# Patient Record
Sex: FEMALE | Race: WHITE | NOT HISPANIC OR LATINO | Employment: PART TIME | URBAN - METROPOLITAN AREA
[De-identification: names, ages, dates, MRNs, and addresses within clinical notes are randomized per-mention and may not be internally consistent; named-entity substitution may affect disease eponyms.]

---

## 2019-03-31 ENCOUNTER — APPOINTMENT (OUTPATIENT)
Dept: CT IMAGING | Facility: CLINIC | Age: 59
End: 2019-03-31
Attending: FAMILY MEDICINE
Payer: OTHER MISCELLANEOUS

## 2019-03-31 ENCOUNTER — HOSPITAL ENCOUNTER (EMERGENCY)
Facility: CLINIC | Age: 59
Discharge: HOME | End: 2019-03-31
Attending: FAMILY MEDICINE | Admitting: FAMILY MEDICINE
Payer: OTHER MISCELLANEOUS

## 2019-03-31 VITALS
OXYGEN SATURATION: 99 % | TEMPERATURE: 98.4 F | BODY MASS INDEX: 26.87 KG/M2 | SYSTOLIC BLOOD PRESSURE: 148 MMHG | DIASTOLIC BLOOD PRESSURE: 84 MMHG | WEIGHT: 159 LBS | RESPIRATION RATE: 16 BRPM | HEART RATE: 70 BPM

## 2019-03-31 DIAGNOSIS — S00.10XA: ICD-10-CM

## 2019-03-31 DIAGNOSIS — G44.319 ACUTE POST-TRAUMATIC HEADACHE, NOT INTRACTABLE: ICD-10-CM

## 2019-03-31 DIAGNOSIS — S32.10XA CLOSED FRACTURE OF SACRUM, UNSPECIFIED PORTION OF SACRUM, INITIAL ENCOUNTER (H): ICD-10-CM

## 2019-03-31 DIAGNOSIS — R11.0 NAUSEA: ICD-10-CM
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
## 2019-03-31 PROCEDURE — 99284 EMERGENCY DEPT VISIT MOD MDM: CPT | Mod: Z6 | Performed by: FAMILY MEDICINE

## 2019-03-31 PROCEDURE — 25000131 ZZH RX MED GY IP 250 OP 636 PS 637: Performed by: FAMILY MEDICINE

## 2019-03-31 PROCEDURE — 70450 CT HEAD/BRAIN W/O DYE: CPT

## 2019-03-31 PROCEDURE — 25000132 ZZH RX MED GY IP 250 OP 250 PS 637: Performed by: FAMILY MEDICINE

## 2019-03-31 PROCEDURE — 72125 CT NECK SPINE W/O DYE: CPT

## 2019-03-31 PROCEDURE — 99284 EMERGENCY DEPT VISIT MOD MDM: CPT | Mod: 25 | Performed by: FAMILY MEDICINE

## 2019-03-31 RX ORDER — ONDANSETRON 4 MG/1
4 TABLET, ORALLY DISINTEGRATING ORAL
Status: COMPLETED | OUTPATIENT
Start: 2019-03-31 | End: 2019-03-31

## 2019-03-31 RX ORDER — IBUPROFEN 200 MG
600 TABLET ORAL EVERY 6 HOURS PRN
Refills: 0 | COMMUNITY
Start: 2019-03-31 | End: 2019-04-03

## 2019-03-31 RX ORDER — ONDANSETRON 4 MG/1
4 TABLET, ORALLY DISINTEGRATING ORAL EVERY 6 HOURS PRN
Qty: 12 TABLET | Refills: 0 | Status: SHIPPED | OUTPATIENT
Start: 2019-03-31 | End: 2019-04-03

## 2019-03-31 RX ORDER — OXYCODONE HYDROCHLORIDE 5 MG/1
5 TABLET ORAL ONCE
Status: COMPLETED | OUTPATIENT
Start: 2019-03-31 | End: 2019-03-31

## 2019-03-31 RX ORDER — ONDANSETRON 4 MG/1
4 TABLET, ORALLY DISINTEGRATING ORAL ONCE
Status: DISCONTINUED | OUTPATIENT
Start: 2019-03-31 | End: 2019-03-31

## 2019-03-31 RX ORDER — ACETAMINOPHEN 500 MG
500-1000 TABLET ORAL EVERY 6 HOURS PRN
Refills: 0 | COMMUNITY
Start: 2019-03-31 | End: 2019-04-04

## 2019-03-31 RX ORDER — OXYCODONE HYDROCHLORIDE 5 MG/1
5 TABLET ORAL EVERY 6 HOURS PRN
Qty: 12 TABLET | Refills: 0 | Status: SHIPPED | OUTPATIENT
Start: 2019-03-31 | End: 2020-05-01

## 2019-03-31 RX ADMIN — OXYCODONE HYDROCHLORIDE 5 MG: 5 TABLET ORAL at 14:02

## 2019-03-31 RX ADMIN — ONDANSETRON 4 MG: 4 TABLET, ORALLY DISINTEGRATING ORAL at 14:05

## 2019-03-31 ASSESSMENT — ENCOUNTER SYMPTOMS
SEIZURES: 0
COLOR CHANGE: 1
FACIAL SWELLING: 1
FATIGUE: 1
PSYCHIATRIC NEGATIVE: 1
BACK PAIN: 1
RESPIRATORY NEGATIVE: 1
HEADACHES: 1
WEAKNESS: 0
GASTROINTESTINAL NEGATIVE: 1
PHOTOPHOBIA: 0
FEVER: 0
CARDIOVASCULAR NEGATIVE: 1

## 2019-03-31 NOTE — ED PROVIDER NOTES
History     Chief Complaint   Patient presents with     Fall     The history is provided by the patient.     Elizabeth De Los Santos is a 58 year old female who presents to the emergency department after a fall two days ago. The patient works for a [employer] company and was climbing backwards out of a truck when she missed a step and fell backwards. She fell onto cement and landed on her tailbone and then hit the back of her head. She denies any loss of consciousness. She was dizzy immediately after the fall, but was able to push herself up. The patient went to [facility] where they did an X Ray of her tailbone and did not find any fractures. She reports they did not do any imaging of her head at that time. When the patient woke up this morning, she had bruising bilaterally around her eyes and a 5/10 headache. She feels like her eyes are swollen. She denies any visual changes. Patient denies any tinnitus. She has had a head cold for the last 3 weeks, but denies any more rhinorrhea than normal. Patient has some right sided neck pain from the fall, but says it feels like a pulled muscle. She does not think she broke any bones during the fall. Patient woke up nauseated today, but has not vomited. She still has 9/10 pain in her tailbone. She has been taking Ibuprofen which lessens her pain. She almost had urinary incontinence during the night last night, but was able to make it to the bathroom in time. She has not dealt with any other incontinence. Patient notes that she had some numbness in her entire left leg yesterday, but that has now subsided. She also notes some right sided jaw pain when chewing. Patient is otherwise healthy. She is on Propranolol daily. She has the gene for Factor 5 Leiden, but is not homozygous and not on any blood thinner for this.    Allergies:  Allergies   Allergen Reactions     Flexeril [Cyclobenzaprine]      Headache, tight chest       Problem List:    Patient Active Problem List    Diagnosis  Date Noted     Elevated BP 12/18/2012     Priority: Medium     CARDIOVASCULAR SCREENING; LDL GOAL LESS THAN 160 12/18/2012     Priority: Medium     Osteoporosis      Priority: Medium        Past Medical History:    Past Medical History:   Diagnosis Date     Osteoporosis        Past Surgical History:    Past Surgical History:   Procedure Laterality Date     NO HISTORY OF SURGERY         Family History:    Family History   Problem Relation Age of Onset     Alzheimer Disease Mother      Cancer Father 38        1968, leukemia     Blood Disease Brother         lymphoma     Blood Disease Daughter         factor 5       Social History:  Marital Status:   [2]  Social History     Tobacco Use     Smoking status: Never Smoker     Smokeless tobacco: Never Used   Substance Use Topics     Alcohol use: No     Drug use: No        Medications:      alendronate (FOSAMAX) 70 MG tablet   atenolol (TENORMIN) 25 MG tablet   hydrochlorothiazide (HYDRODIURIL) 25 MG tablet         Review of Systems   Constitutional: Positive for fatigue. Negative for fever.   HENT: Positive for facial swelling (Around eyes and eye lids with some bruising noted starting yesterday.).    Eyes: Negative for photophobia and visual disturbance.   Respiratory: Negative.    Cardiovascular: Negative.    Gastrointestinal: Negative.    Genitourinary: Negative.    Musculoskeletal: Positive for back pain (sacral area pain with sitting and movement).   Skin: Positive for color change (Around eyes).   Neurological: Positive for headaches. Negative for seizures and weakness.   Psychiatric/Behavioral: Negative.    All other systems reviewed and are negative.      Physical Exam   BP: (!) 162/102  Heart Rate: 75  Temp: 98.4  F (36.9  C)  Resp: 16  Weight: 72.1 kg (159 lb)  SpO2: 99 %      Physical Exam   Constitutional: She is oriented to person, place, and time. She appears well-developed and well-nourished.   HENT:   Head: Normocephalic. Head is with raccoon's eyes  (?, See picture) and with contusion. Head is without Spaulding's sign.   Nose: No rhinorrhea.   Mouth/Throat: Uvula is midline and oropharynx is clear and moist. No trismus in the jaw.   Patient with some ecchymosis to the eyelids bilaterally.   Eyes: Conjunctivae and EOM are normal. Pupils are equal, round, and reactive to light.   Fundoscopic exam:       The right eye shows no hemorrhage and no papilledema.        The left eye shows no hemorrhage and no papilledema.   See picture   Neck: Normal range of motion. Neck supple. No JVD present.   Cardiovascular: Normal rate and regular rhythm.   Pulmonary/Chest: Effort normal. No respiratory distress.   Abdominal: Soft. Bowel sounds are normal.   Musculoskeletal:        Lumbar back: She exhibits tenderness, bony tenderness and pain.        Back:    Neurological: She is alert and oriented to person, place, and time. She displays normal reflexes. No cranial nerve deficit or sensory deficit. She exhibits normal muscle tone. Coordination normal.   Skin: Skin is warm. Capillary refill takes less than 2 seconds. No rash noted.   Psychiatric: She has a normal mood and affect. Her behavior is normal. Judgment and thought content normal.   Nursing note and vitals reviewed.          ED Course        Procedures               Critical Care time:  none               Results for orders placed or performed during the hospital encounter of 03/31/19 (from the past 24 hour(s))   CT Head w/o Contrast    Narrative    CT SCAN OF THE HEAD WITHOUT CONTRAST   3/31/2019 2:25 PM     HISTORY: Headache, posttraumatic.    TECHNIQUE: Axial images of the head and coronal reformations without  IV contrast material. Radiation dose for this scan was reduced using  automated exposure control, adjustment of the mA and/or kV according  to patient size, or iterative reconstruction technique.    COMPARISON: None.    FINDINGS: The cerebral hemispheres, brainstem, and cerebellum  demonstrate normal morphology  and attenuation. Incidental normal  variant cavum septum pellucidum noted. No evidence of ischemia,  hemorrhage, mass, mass effect, or hydrocephalus. The visualized  calvarium, skull base, paranasal sinuses, and extracranial soft  tissues are unremarkable.      Impression    IMPRESSION: No acute intracranial abnormality.    KARLA JARA MD   Cervical spine CT w/o contrast    Narrative    CT CERVICAL SPINE WITHOUT CONTRAST   3/31/2019 2:25 PM     HISTORY: Cervical spine trauma, low clinical risk (NEXUS/CCR). Fall,  neck. Headache.      TECHNIQUE: Axial images of the cervical spine were obtained without  intravenous contrast. Multiplanar reformations were performed.   Radiation dose for this scan was reduced using automated exposure  control, adjustment of the mA and/or kV according to patient size, or  iterative reconstruction technique.    COMPARISON: None.    FINDINGS: Alignment is significant for slight dextroconvex curvature  of the cervical spine with slight levoconvex curvature of the upper  thoracic spine. No evidence of acute fracture or traumatic  subluxation. No evidence of acute traumatic disc herniation or  epidural hematoma. Multilevel mild to moderate degenerative changes  are present throughout the cervical spine with degenerative disc  disease and endplate irregularity most conspicuous along the right  aspect of the C3-4 disc joint. Paraspinal soft tissues demonstrate no  significant appreciable abnormality. Right-sided air-filled  laryngocele incidentally noted.      Impression    IMPRESSION: No evidence of acute fracture or subluxation in the  cervical spine.    KARLA JARA MD       Medications   ondansetron (ZOFRAN-ODT) ODT tab 4 mg (4 mg Oral Given 3/31/19 1405)   oxyCODONE (ROXICODONE) tablet 5 mg (5 mg Oral Given 3/31/19 1402)       Assessments & Plan (with Medical Decision Making)  58-year-old female to the ER secondary to concerns of persistent headache since she fell at work this last  Friday, 2 days ago.  She was seen at the [place], [place] emergency room and had x-rays taken of her pelvic area and was told that she had a contusion to her pelvis.  Patient is concerned about her continued headache and the findings in the last day of bruising around her eyes.  CT scan was done to look for the possibility of a basilar skull fracture but fortunately no evidence of such was seen.  Patient has significant improvement in her symptoms of nausea and pain with the medications given above.  I did research the radiological interpretation of her x-rays at the [place], [place] emergency room and the patient was found to have a nondisplaced fracture through the sacrum.  Fracture is nondisplaced.  Spent quite a bit of time discussing concussion and the potential risk associated with head injury.  Also discussed the fracture to her sacral area.  Patient desires return home at this time.  She plans follow-up with her clinic in [place], [place].  Occasion prescribed as listed below.  She was given written instructions discussing concussion and signs and symptoms that would be of concern and when to return to the ER if noted.  She was discharged in the care of her family.     I have reviewed the nursing notes.    I have reviewed the findings, diagnosis, plan and need for follow up with the patient.          Medication List      Started    acetaminophen 500 MG tablet  Commonly known as:  TYLENOL  500-1,000 mg, Oral, EVERY 6 HOURS PRN     ibuprofen 200 MG tablet  Commonly known as:  ADVIL/MOTRIN  600 mg, Oral, EVERY 6 HOURS PRN, TAKE WITH FOOD AS NEEDED FOR PAIN     ondansetron 4 MG ODT tab  Commonly known as:  ZOFRAN ODT  4 mg, Oral, EVERY 6 HOURS PRN     oxyCODONE 5 MG tablet  Commonly known as:  ROXICODONE  5 mg, Oral, EVERY 6 HOURS PRN               I verbally discussed the findings of the evaluation today in the ER. I have verbally discussed with Elizabeth the suggested treatment(s) as described in the  discharge instructions and handouts. I have prescribed the above listed medications and instructed her on appropriate use of these medications.      I have verbally suggested she follow-up in her clinic or return to the ER for increased symptoms. See the follow-up recommendations documented  in the after visit summary in this visit's EPIC chart.      Final diagnoses:   Fall from mobile elevated work platform as cause of accidental injury   Closed fracture of sacrum, unspecified portion of sacrum, initial encounter (H)   Acute post-traumatic headache, not intractable   Traumatic black eye, unspecified laterality, initial encounter   Nausea     This document serves as a record of services personally performed by Giovani Orr,*. It was created on their behalf by Alexa Lozada, a trained medical scribe. The creation of this record is based on the provider's personal observations and the statements of the patient. This document has been checked and approved by the attending provider.  Note: Chart documentation done in part with Dragon Voice Recognition software. Although reviewed after completion, some word and grammatical errors may remain.    3/31/2019   [facility] EMERGENCY DEPARTMENT     Giovani Orr,   03/31/19 1518

## 2019-03-31 NOTE — DISCHARGE INSTRUCTIONS
Please read and follow the handout(s) instructions. Return, if needed, for increased or worsening symptoms and as directed by the handout(s).    I hope you feel better soon!    Electronically signed, Giovani Orr DO

## 2019-03-31 NOTE — ED AVS SNAPSHOT
[facility] Emergency Department  [address] [place] [address]  Phone:  [phone]  Fax:  [phone]                                    Elizabeth De Los Santos   MRN: [id number]    Department:  [facility] Emergency Department   Date of Visit:  3/31/2019           After Visit Summary Signature Page    I have received my discharge instructions, and my questions have been answered. I have discussed any challenges I see with this plan with the nurse or doctor.    ..........................................................................................................................................  Patient/Patient Representative Signature      ..........................................................................................................................................  Patient Representative Print Name and Relationship to Patient    ..................................................               ................................................  Date                                   Time    ..........................................................................................................................................  Reviewed by Signature/Title    ...................................................              ..............................................  Date                                               Time          22EPIC Rev 08/18

## 2019-03-31 NOTE — ED TRIAGE NOTES
Pt comes in after falling out of a truck on Friday. Pt approximated the fall from about 3 1/2 ft up. Pt states he hit her tailbone and then her head. Pt states she was seen in ED in [place], but never had a head CT. Pt has bruising around bilateral eyes. Pt is nauseous and has a HA.

## 2019-04-01 ENCOUNTER — APPOINTMENT (OUTPATIENT)
Dept: CT IMAGING | Facility: CLINIC | Age: 59
End: 2019-04-01
Attending: EMERGENCY MEDICINE
Payer: OTHER MISCELLANEOUS

## 2019-04-01 ENCOUNTER — HOSPITAL ENCOUNTER (OUTPATIENT)
Facility: CLINIC | Age: 59
Setting detail: OBSERVATION
Discharge: HOME | End: 2019-04-02
Attending: EMERGENCY MEDICINE | Admitting: HOSPITALIST
Payer: OTHER MISCELLANEOUS

## 2019-04-01 DIAGNOSIS — G44.301 INTRACTABLE POST-TRAUMATIC HEADACHE, UNSPECIFIED CHRONICITY PATTERN: ICD-10-CM

## 2019-04-01 DIAGNOSIS — F07.81 POST CONCUSSIVE SYNDROME: ICD-10-CM

## 2019-04-01 PROBLEM — S06.0X0A CONCUSSION WITHOUT LOSS OF CONSCIOUSNESS: Status: ACTIVE | Noted: 2019-04-01

## 2019-04-01 PROBLEM — S32.10XA FRACTURE OF SACRUM (H): Status: ACTIVE | Noted: 2019-04-01

## 2019-04-01 PROBLEM — R11.2 INTRACTABLE NAUSEA AND VOMITING: Status: ACTIVE | Noted: 2019-04-01

## 2019-04-01 LAB
ANION GAP SERPL CALCULATED.3IONS-SCNC: 12 MMOL/L (ref 3–14)
APTT PPP: 29 SEC (ref 22–37)
BASOPHILS # BLD AUTO: 0.1 10E9/L (ref 0–0.2)
BASOPHILS NFR BLD AUTO: 0.7 %
BUN SERPL-MCNC: 9 MG/DL (ref 7–30)
CALCIUM SERPL-MCNC: 8.9 MG/DL (ref 8.5–10.1)
CHLORIDE SERPL-SCNC: 109 MMOL/L (ref 94–109)
CO2 SERPL-SCNC: 23 MMOL/L (ref 20–32)
CREAT SERPL-MCNC: 0.57 MG/DL (ref 0.52–1.04)
DIFFERENTIAL METHOD BLD: NORMAL
EOSINOPHIL NFR BLD AUTO: 2.9 %
ERYTHROCYTE [DISTWIDTH] IN BLOOD BY AUTOMATED COUNT: 12.3 % (ref 10–15)
GFR SERPL CREATININE-BSD FRML MDRD: >90 ML/MIN/{1.73_M2}
GLUCOSE SERPL-MCNC: 101 MG/DL (ref 70–99)
HCT VFR BLD AUTO: 38.7 % (ref 35–47)
HGB BLD-MCNC: 12.8 G/DL (ref 11.7–15.7)
IMM GRANULOCYTES # BLD: 0 10E9/L (ref 0–0.4)
IMM GRANULOCYTES NFR BLD: 0.3 %
INR PPP: 1.02 (ref 0.86–1.14)
LYMPHOCYTES # BLD AUTO: 1.9 10E9/L (ref 0.8–5.3)
LYMPHOCYTES NFR BLD AUTO: 21 %
MCH RBC QN AUTO: 30.3 PG (ref 26.5–33)
MCHC RBC AUTO-ENTMCNC: 33.1 G/DL (ref 31.5–36.5)
MCV RBC AUTO: 92 FL (ref 78–100)
MONOCYTES # BLD AUTO: 0.7 10E9/L (ref 0–1.3)
MONOCYTES NFR BLD AUTO: 7.4 %
NEUTROPHILS # BLD AUTO: 6.2 10E9/L (ref 1.6–8.3)
NEUTROPHILS NFR BLD AUTO: 67.7 %
NRBC # BLD AUTO: 0 10*3/UL
NRBC BLD AUTO-RTO: 0 /100
PLATELET # BLD AUTO: 251 10E9/L (ref 150–450)
POTASSIUM SERPL-SCNC: 3.4 MMOL/L (ref 3.4–5.3)
RBC # BLD AUTO: 4.23 10E12/L (ref 3.8–5.2)
SODIUM SERPL-SCNC: 144 MMOL/L (ref 133–144)
WBC # BLD AUTO: 9.1 10E9/L (ref 4–11)

## 2019-04-01 PROCEDURE — 96374 THER/PROPH/DIAG INJ IV PUSH: CPT | Performed by: EMERGENCY MEDICINE

## 2019-04-01 PROCEDURE — 25000128 H RX IP 250 OP 636: Performed by: EMERGENCY MEDICINE

## 2019-04-01 PROCEDURE — G0378 HOSPITAL OBSERVATION PER HR: HCPCS

## 2019-04-01 PROCEDURE — 25000132 ZZH RX MED GY IP 250 OP 250 PS 637: Performed by: HOSPITALIST

## 2019-04-01 PROCEDURE — 99219 ZZC INITIAL OBSERVATION CARE,LEVL II: CPT | Performed by: HOSPITALIST

## 2019-04-01 PROCEDURE — 96375 TX/PRO/DX INJ NEW DRUG ADDON: CPT | Performed by: EMERGENCY MEDICINE

## 2019-04-01 PROCEDURE — 80048 BASIC METABOLIC PNL TOTAL CA: CPT | Performed by: EMERGENCY MEDICINE

## 2019-04-01 PROCEDURE — 70450 CT HEAD/BRAIN W/O DYE: CPT

## 2019-04-01 PROCEDURE — 85610 PROTHROMBIN TIME: CPT | Performed by: EMERGENCY MEDICINE

## 2019-04-01 PROCEDURE — 25000128 H RX IP 250 OP 636: Performed by: HOSPITALIST

## 2019-04-01 PROCEDURE — 85730 THROMBOPLASTIN TIME PARTIAL: CPT | Performed by: EMERGENCY MEDICINE

## 2019-04-01 PROCEDURE — 85025 COMPLETE CBC W/AUTO DIFF WBC: CPT | Performed by: EMERGENCY MEDICINE

## 2019-04-01 PROCEDURE — 96361 HYDRATE IV INFUSION ADD-ON: CPT | Performed by: EMERGENCY MEDICINE

## 2019-04-01 PROCEDURE — 99285 EMERGENCY DEPT VISIT HI MDM: CPT | Mod: Z6 | Performed by: EMERGENCY MEDICINE

## 2019-04-01 PROCEDURE — 99285 EMERGENCY DEPT VISIT HI MDM: CPT | Mod: 25 | Performed by: EMERGENCY MEDICINE

## 2019-04-01 PROCEDURE — 99207 ZZC CDG-MDM COMPONENT: MEETS MODERATE - UP CODED: CPT | Performed by: HOSPITALIST

## 2019-04-01 RX ORDER — NALOXONE HYDROCHLORIDE 0.4 MG/ML
.1-.4 INJECTION, SOLUTION INTRAMUSCULAR; INTRAVENOUS; SUBCUTANEOUS
Status: DISCONTINUED | OUTPATIENT
Start: 2019-04-01 | End: 2019-04-02 | Stop reason: HOSPADM

## 2019-04-01 RX ORDER — ONDANSETRON 4 MG/1
4 TABLET, ORALLY DISINTEGRATING ORAL EVERY 6 HOURS PRN
Status: DISCONTINUED | OUTPATIENT
Start: 2019-04-01 | End: 2019-04-02 | Stop reason: HOSPADM

## 2019-04-01 RX ORDER — SODIUM CHLORIDE AND POTASSIUM CHLORIDE 150; 900 MG/100ML; MG/100ML
INJECTION, SOLUTION INTRAVENOUS CONTINUOUS
Status: DISCONTINUED | OUTPATIENT
Start: 2019-04-01 | End: 2019-04-02 | Stop reason: HOSPADM

## 2019-04-01 RX ORDER — OXYCODONE HYDROCHLORIDE 5 MG/1
5 TABLET ORAL EVERY 4 HOURS PRN
Status: DISCONTINUED | OUTPATIENT
Start: 2019-04-01 | End: 2019-04-02 | Stop reason: HOSPADM

## 2019-04-01 RX ORDER — MORPHINE SULFATE 2 MG/ML
2 INJECTION, SOLUTION INTRAMUSCULAR; INTRAVENOUS
Status: DISCONTINUED | OUTPATIENT
Start: 2019-04-01 | End: 2019-04-02 | Stop reason: HOSPADM

## 2019-04-01 RX ORDER — PROCHLORPERAZINE MALEATE 10 MG
10 TABLET ORAL EVERY 6 HOURS PRN
Qty: 12 TABLET | Refills: 0 | Status: SHIPPED | OUTPATIENT
Start: 2019-04-01 | End: 2019-04-01

## 2019-04-01 RX ORDER — ACETAMINOPHEN 650 MG/1
650 SUPPOSITORY RECTAL EVERY 4 HOURS PRN
Status: DISCONTINUED | OUTPATIENT
Start: 2019-04-01 | End: 2019-04-02 | Stop reason: HOSPADM

## 2019-04-01 RX ORDER — PROCHLORPERAZINE MALEATE 10 MG
10 TABLET ORAL EVERY 6 HOURS PRN
Qty: 12 TABLET | Refills: 0 | Status: SHIPPED | OUTPATIENT
Start: 2019-04-01 | End: 2020-05-01

## 2019-04-01 RX ORDER — SODIUM CHLORIDE 9 MG/ML
1000 INJECTION, SOLUTION INTRAVENOUS CONTINUOUS
Status: DISCONTINUED | OUTPATIENT
Start: 2019-04-01 | End: 2019-04-02 | Stop reason: HOSPADM

## 2019-04-01 RX ORDER — ACETAMINOPHEN 500 MG
500-1000 TABLET ORAL EVERY 6 HOURS PRN
Status: DISCONTINUED | OUTPATIENT
Start: 2019-04-01 | End: 2019-04-01

## 2019-04-01 RX ORDER — PROCHLORPERAZINE 25 MG
25 SUPPOSITORY, RECTAL RECTAL EVERY 12 HOURS PRN
Status: DISCONTINUED | OUTPATIENT
Start: 2019-04-01 | End: 2019-04-02 | Stop reason: HOSPADM

## 2019-04-01 RX ORDER — ONDANSETRON 2 MG/ML
4 INJECTION INTRAMUSCULAR; INTRAVENOUS EVERY 6 HOURS PRN
Status: DISCONTINUED | OUTPATIENT
Start: 2019-04-01 | End: 2019-04-02 | Stop reason: HOSPADM

## 2019-04-01 RX ORDER — KETOROLAC TROMETHAMINE 30 MG/ML
30 INJECTION, SOLUTION INTRAMUSCULAR; INTRAVENOUS ONCE
Status: COMPLETED | OUTPATIENT
Start: 2019-04-01 | End: 2019-04-01

## 2019-04-01 RX ORDER — POLYETHYLENE GLYCOL 3350 17 G/17G
17 POWDER, FOR SOLUTION ORAL DAILY PRN
Status: DISCONTINUED | OUTPATIENT
Start: 2019-04-01 | End: 2019-04-02 | Stop reason: HOSPADM

## 2019-04-01 RX ORDER — PROCHLORPERAZINE MALEATE 5 MG
10 TABLET ORAL EVERY 6 HOURS PRN
Status: DISCONTINUED | OUTPATIENT
Start: 2019-04-01 | End: 2019-04-02 | Stop reason: HOSPADM

## 2019-04-01 RX ORDER — PROPRANOLOL HYDROCHLORIDE 10 MG/1
10 TABLET ORAL 2 TIMES DAILY
COMMUNITY

## 2019-04-01 RX ORDER — ACETAMINOPHEN 325 MG/1
650 TABLET ORAL EVERY 4 HOURS PRN
Status: DISCONTINUED | OUTPATIENT
Start: 2019-04-01 | End: 2019-04-02 | Stop reason: HOSPADM

## 2019-04-01 RX ORDER — DIPHENHYDRAMINE HYDROCHLORIDE 50 MG/ML
25 INJECTION INTRAMUSCULAR; INTRAVENOUS ONCE
Status: COMPLETED | OUTPATIENT
Start: 2019-04-01 | End: 2019-04-01

## 2019-04-01 RX ORDER — ONDANSETRON 2 MG/ML
4 INJECTION INTRAMUSCULAR; INTRAVENOUS EVERY 30 MIN PRN
Status: DISCONTINUED | OUTPATIENT
Start: 2019-04-01 | End: 2019-04-01

## 2019-04-01 RX ADMIN — POTASSIUM CHLORIDE AND SODIUM CHLORIDE: 900; 150 INJECTION, SOLUTION INTRAVENOUS at 22:22

## 2019-04-01 RX ADMIN — OXYCODONE HYDROCHLORIDE 5 MG: 5 TABLET ORAL at 22:07

## 2019-04-01 RX ADMIN — PROCHLORPERAZINE EDISYLATE 10 MG: 5 INJECTION INTRAMUSCULAR; INTRAVENOUS at 19:08

## 2019-04-01 RX ADMIN — DIPHENHYDRAMINE HYDROCHLORIDE 25 MG: 50 INJECTION, SOLUTION INTRAMUSCULAR; INTRAVENOUS at 19:10

## 2019-04-01 RX ADMIN — KETOROLAC TROMETHAMINE 30 MG: 30 INJECTION, SOLUTION INTRAMUSCULAR at 19:14

## 2019-04-01 RX ADMIN — SODIUM CHLORIDE 1000 ML: 9 INJECTION, SOLUTION INTRAVENOUS at 19:08

## 2019-04-01 ASSESSMENT — ACTIVITIES OF DAILY LIVING (ADL)
SWALLOWING: 0-->SWALLOWS FOODS/LIQUIDS WITHOUT DIFFICULTY
TRANSFERRING: 0-->INDEPENDENT
FALL_HISTORY_WITHIN_LAST_SIX_MONTHS: YES
COGNITION: 0 - NO COGNITION ISSUES REPORTED
NUMBER_OF_TIMES_PATIENT_HAS_FALLEN_WITHIN_LAST_SIX_MONTHS: 1
AMBULATION: 0-->INDEPENDENT
RETIRED_EATING: 0-->INDEPENDENT
TOILETING: 0-->INDEPENDENT
DRESS: 0-->INDEPENDENT
RETIRED_COMMUNICATION: 0-->UNDERSTANDS/COMMUNICATES WITHOUT DIFFICULTY
BATHING: 0-->INDEPENDENT

## 2019-04-01 ASSESSMENT — MIFFLIN-ST. JEOR: SCORE: 1277.88

## 2019-04-01 NOTE — ED PROVIDER NOTES
"  History     Chief Complaint   Patient presents with     Fall     Nausea & Vomiting     The history is provided by the patient.     Elizabeth De Los Santos is a 58 year old female who presents to the emergency department for a fall, nausea and vomiting. Patient fell at work on 3/29/19 and since then has had nausea, vomiting and a headache. Patient reports while at work, stepping off a work truck backwards, missed the step, fell backwards onto the cement and hit the back of her head and tailbone. She denies any LOC. She was seen the day of the injury in [place] and then again yesterday in our ED. She was given nausea medication which she took 2 Zofran and 1 Compazine today with no relief. States she has only been able to keep a little bit of water down, she is unable to eat. Her headache is a frontal headache at a 9/10 and states her \"eyeballs are painful\". She has bruising around both of her eyes and on her left elbow. She denies any problems with her vision or speech.    Allergies:  Allergies   Allergen Reactions     Flexeril [Cyclobenzaprine]      Headache, tight chest       Problem List:    Patient Active Problem List    Diagnosis Date Noted     Intractable nausea and vomiting 04/01/2019     Priority: Medium     Concussion without loss of consciousness 04/01/2019     Priority: Medium     Fracture of sacrum (H) 04/01/2019     Priority: Medium     Elevated BP without diagnosis of hypertension 12/18/2012     Priority: Medium     CARDIOVASCULAR SCREENING; LDL GOAL LESS THAN 160 12/18/2012     Priority: Medium     Osteoporosis      Priority: Medium        Past Medical History:    Past Medical History:   Diagnosis Date     Osteoporosis        Past Surgical History:    Past Surgical History:   Procedure Laterality Date     NO HISTORY OF SURGERY         Family History:    Family History   Problem Relation Age of Onset     Alzheimer Disease Mother      Cancer Father 38        1968, leukemia     Blood Disease Brother         " "lymphoma     Blood Disease Daughter         factor 5       Social History:  Marital Status:   [2]  Social History     Tobacco Use     Smoking status: Never Smoker     Smokeless tobacco: Never Used   Substance Use Topics     Alcohol use: No     Drug use: No        Medications:      No current outpatient medications on file.      Review of Systems   All other systems reviewed and are negative.      Physical Exam   BP: 154/88  Pulse: 69  Heart Rate: 82  Temp: 98  F (36.7  C)  Resp: 20  Height: 165.1 cm (5' 5\")  Weight: 71.5 kg (157 lb 11.2 oz)  SpO2: 97 %      Physical Exam   Constitutional: She is oriented to person, place, and time. She appears well-developed and well-nourished. No distress.   HENT:   Head: Normocephalic and atraumatic.   Right Ear: Tympanic membrane normal. No hemotympanum.   Left Ear: Tympanic membrane normal. No hemotympanum.   Just right of midline, posterior upper scalp, area of swelling, no crepitus.   Eyes: EOM are normal. No scleral icterus.   No subconjunctival hemorrhage. Periorbital ecchymosis bilaterally, most noticeable right inferior to bilateral eyes.       Neck: Normal range of motion. Neck supple.   Cardiovascular: Normal rate.   Pulmonary/Chest: Effort normal.   Musculoskeletal: Normal range of motion.   Neurological: She is alert and oriented to person, place, and time. She has normal strength. No cranial nerve deficit or sensory deficit. GCS eye subscore is 4. GCS verbal subscore is 5. GCS motor subscore is 6.   Affect is bright and alert.  Strength is intact in lower extremities.   Skin: Skin is warm and dry. No rash noted. She is not diaphoretic. No erythema. No pallor.   Psychiatric: She has a normal mood and affect. Her behavior is normal. Thought content normal.   Nursing note and vitals reviewed.      ED Course        Procedures               Critical Care time:  none               Results for orders placed or performed during the hospital encounter of 04/01/19 (from " the past 24 hour(s))   CBC with platelets differential   Result Value Ref Range    WBC 9.1 4.0 - 11.0 10e9/L    RBC Count 4.23 3.8 - 5.2 10e12/L    Hemoglobin 12.8 11.7 - 15.7 g/dL    Hematocrit 38.7 35.0 - 47.0 %    MCV 92 78 - 100 fl    MCH 30.3 26.5 - 33.0 pg    MCHC 33.1 31.5 - 36.5 g/dL    RDW 12.3 10.0 - 15.0 %    Platelet Count 251 150 - 450 10e9/L    Diff Method Automated Method     % Neutrophils 67.7 %    % Lymphocytes 21.0 %    % Monocytes 7.4 %    % Eosinophils 2.9 %    % Basophils 0.7 %    % Immature Granulocytes 0.3 %    Nucleated RBCs 0 0 /100    Absolute Neutrophil 6.2 1.6 - 8.3 10e9/L    Absolute Lymphocytes 1.9 0.8 - 5.3 10e9/L    Absolute Monocytes 0.7 0.0 - 1.3 10e9/L    Absolute Basophils 0.1 0.0 - 0.2 10e9/L    Abs Immature Granulocytes 0.0 0 - 0.4 10e9/L    Absolute Nucleated RBC 0.0    INR   Result Value Ref Range    INR 1.02 0.86 - 1.14   Partial thromboplastin time   Result Value Ref Range    PTT 29 22 - 37 sec   Basic metabolic panel   Result Value Ref Range    Sodium 144 133 - 144 mmol/L    Potassium 3.4 3.4 - 5.3 mmol/L    Chloride 109 94 - 109 mmol/L    Carbon Dioxide 23 20 - 32 mmol/L    Anion Gap 12 3 - 14 mmol/L    Glucose 101 (H) 70 - 99 mg/dL    Urea Nitrogen 9 7 - 30 mg/dL    Creatinine 0.57 0.52 - 1.04 mg/dL    GFR Estimate >90 >60 mL/min/[1.73_m2]    GFR Estimate If Black >90 >60 mL/min/[1.73_m2]    Calcium 8.9 8.5 - 10.1 mg/dL   CT Head w/o Contrast    Narrative    CT SCAN OF THE HEAD WITHOUT CONTRAST   4/1/2019 7:52 PM     HISTORY: Headache, posttraumatic.    TECHNIQUE:  Axial images of the head and coronal reformations without  IV contrast material. Radiation dose for this scan was reduced using  automated exposure control, adjustment of the mA and/or kV according  to patient size, or iterative reconstruction technique.    COMPARISON: 3/31/2019.    FINDINGS:  The cerebral hemispheres, brainstem, and cerebellum  demonstrate normal morphology and attenuation. Incidental cavum  septum  pellucidum is noted. No evidence of ischemia, hemorrhage, mass, mass  effect, or hydrocephalus. The visualized calvarium, skull base,  paranasal sinuses, and extracranial soft tissues are unremarkable.      Impression    IMPRESSION:  No acute intracranial abnormality.       KARLA JARA MD       Medications   0.9% sodium chloride BOLUS (0 mLs Intravenous Stopped 4/1/19 2047)     Followed by   sodium chloride 0.9% infusion (1,000 mLs Intravenous Not Given 4/1/19 2128)   oxyCODONE (ROXICODONE) tablet 5 mg (5 mg Oral Given 4/1/19 2207)   acetaminophen (TYLENOL) tablet 650 mg (not administered)   acetaminophen (TYLENOL) Suppository 650 mg (not administered)   naloxone (NARCAN) injection 0.1-0.4 mg (not administered)   melatonin tablet 1 mg (not administered)   ondansetron (ZOFRAN-ODT) ODT tab 4 mg (not administered)     Or   ondansetron (ZOFRAN) injection 4 mg (not administered)   0.9% sodium chloride + KCl 20 mEq/L infusion (not administered)   morphine (PF) injection 2 mg (not administered)   prochlorperazine (COMPAZINE) injection 10 mg (not administered)     Or   prochlorperazine (COMPAZINE) tablet 10 mg (not administered)     Or   prochlorperazine (COMPAZINE) Suppository 25 mg (not administered)   polyethylene glycol (MIRALAX/GLYCOLAX) Packet 17 g (not administered)   prochlorperazine (COMPAZINE) injection 10 mg (10 mg Intravenous Given 4/1/19 1908)   ketorolac (TORADOL) injection 30 mg (30 mg Intravenous Given 4/1/19 1914)   diphenhydrAMINE (BENADRYL) injection 25 mg (25 mg Intravenous Given 4/1/19 1910)       Assessments & Plan (with Medical Decision Making)  58-year-old female who presents with some intractable nausea and vomiting at home.  She is 3 days status post sacral fracture and closed head injury.  She was initially seen in [place] where x-ray was preliminarily read as negative on the sacrum.  It appears it was over read by radiology today which shows a sacral fracture.  Lumbar x-rays were  negative.  She hit the back of her head pretty hard.  She has persistent headache.  She was seen here yesterday and had a head CT as well as cervical spine CT which were negative for traumatic findings.  She is tried Zofran and Compazine at home without relief.  She has been taking oxycodone for pain which may be exacerbating some nausea.  She has some periorbital ecchymosis but denies hitting her anterior head.  Therefore, I did discuss the case in consultation with Dr. Field with neurosurgery.  He recommended a repeat head CT which was performed and did not show any changes.  She improved here with some Toradol, Compazine and Benadryl.  She is admitted to the hospitalist service as discussed and seen in the emergency department by Dr. Farnsworth.  Of note, I did not do any further trauma consult as this was not an admission for an acute trauma.       I have reviewed the nursing notes.    I have reviewed the findings, diagnosis, plan and need for follow up with the patient.          Medication List      There are no discharge medications for this visit.         Final diagnoses:   Post concussive syndrome     This document serves as a record of services personally performed by Abelardo Kay MD. It was created on their behalf by Magnolia Valadez, a trained medical scribe. The creation of this record is based on the provider's personal observations and the statements of the patient. This document has been checked and approved by the attending provider.    Note: Chart documentation done in part with Dragon Voice Recognition software. Although reviewed after completion, some word and grammatical errors may remain.    4/1/2019   [facility] EMERGENCY DEPARTMENT     Abelardo Kay MD  04/01/19 1692

## 2019-04-01 NOTE — LETTER
[address] MEDICAL SURGICAL  [address] [place] [address]  [phone]      April 3, 2019    RE:  Elizabeth De Los Santos                                                                                                                                                       [id number] [facility] [address]            To whom it may concern:    Elizabeth De Los Santos is under my professional care for    Post concussive syndrome    Patient was in the hospital from 4/1/2019-4/2/2019. She  may return to work with the following: The employee is UNABLE to return to work until 4/5/2019.       Sincerely,          JUN Phan

## 2019-04-02 ENCOUNTER — APPOINTMENT (OUTPATIENT)
Dept: OCCUPATIONAL THERAPY | Facility: CLINIC | Age: 59
End: 2019-04-02
Attending: HOSPITALIST
Payer: OTHER MISCELLANEOUS

## 2019-04-02 VITALS
BODY MASS INDEX: 25.79 KG/M2 | HEIGHT: 65 IN | RESPIRATION RATE: 18 BRPM | DIASTOLIC BLOOD PRESSURE: 77 MMHG | WEIGHT: 154.76 LBS | TEMPERATURE: 97.7 F | SYSTOLIC BLOOD PRESSURE: 156 MMHG | HEART RATE: 72 BPM | OXYGEN SATURATION: 96 %

## 2019-04-02 LAB
ANION GAP SERPL CALCULATED.3IONS-SCNC: 6 MMOL/L (ref 3–14)
BUN SERPL-MCNC: 11 MG/DL (ref 7–30)
CALCIUM SERPL-MCNC: 8.4 MG/DL (ref 8.5–10.1)
CHLORIDE SERPL-SCNC: 113 MMOL/L (ref 94–109)
CO2 SERPL-SCNC: 27 MMOL/L (ref 20–32)
CREAT SERPL-MCNC: 0.67 MG/DL (ref 0.52–1.04)
ERYTHROCYTE [DISTWIDTH] IN BLOOD BY AUTOMATED COUNT: 12.3 % (ref 10–15)
GFR SERPL CREATININE-BSD FRML MDRD: >90 ML/MIN/{1.73_M2}
GLUCOSE SERPL-MCNC: 84 MG/DL (ref 70–99)
HCT VFR BLD AUTO: 34.3 % (ref 35–47)
HGB BLD-MCNC: 11.2 G/DL (ref 11.7–15.7)
MCH RBC QN AUTO: 30.1 PG (ref 26.5–33)
MCHC RBC AUTO-ENTMCNC: 32.7 G/DL (ref 31.5–36.5)
MCV RBC AUTO: 92 FL (ref 78–100)
PLATELET # BLD AUTO: 201 10E9/L (ref 150–450)
POTASSIUM SERPL-SCNC: 3.8 MMOL/L (ref 3.4–5.3)
RBC # BLD AUTO: 3.72 10E12/L (ref 3.8–5.2)
SODIUM SERPL-SCNC: 146 MMOL/L (ref 133–144)
WBC # BLD AUTO: 7.5 10E9/L (ref 4–11)

## 2019-04-02 PROCEDURE — 36415 COLL VENOUS BLD VENIPUNCTURE: CPT | Performed by: HOSPITALIST

## 2019-04-02 PROCEDURE — G0378 HOSPITAL OBSERVATION PER HR: HCPCS

## 2019-04-02 PROCEDURE — 80048 BASIC METABOLIC PNL TOTAL CA: CPT | Performed by: HOSPITALIST

## 2019-04-02 PROCEDURE — 97535 SELF CARE MNGMENT TRAINING: CPT | Mod: GO

## 2019-04-02 PROCEDURE — 97165 OT EVAL LOW COMPLEX 30 MIN: CPT | Mod: GO

## 2019-04-02 PROCEDURE — 99217 ZZC OBSERVATION CARE DISCHARGE: CPT | Performed by: NURSE PRACTITIONER

## 2019-04-02 PROCEDURE — 85027 COMPLETE CBC AUTOMATED: CPT | Performed by: HOSPITALIST

## 2019-04-02 ASSESSMENT — ACTIVITIES OF DAILY LIVING (ADL): PREVIOUS_RESPONSIBILITIES: MEAL PREP;HOUSEKEEPING;LAUNDRY;SHOPPING;YARDWORK;MEDICATION MANAGEMENT;FINANCES;DRIVING;WORK

## 2019-04-02 NOTE — PROGRESS NOTES
S-(situation): Patient registered to Observation. Patient arrived to room 268 from ED @ 2100    B-(background): nausea/ vomiting/ headache/ fall at work    A-(assessment): LS clear, afebrile, bruising under both eyes, no other skin issues.  C/O headache, denies nausea.    R-(recommendations): Orders and observation goals reviewed with pt/ spouse    Nursing Observation criteria listed below was met:    Skin issues/needs documented:see above note  Isolation needs addressed, if appropriate: n/a  Fall Prevention: Education given and documented: n/a  Education Assessment documented:yes  Education Documented (Pre-existing chronic infection such as, MRSA/VRE need education on admission): yes  OBS video/handout Reviewed & Documented seen in the ED  Medication Reconciliation Complete: yes  New medication patient education completed and documented (Possible Side Effects of Common Medications handout):yes  Home medications if not able to send immediately home with family stored here: n/a  Reminder note placed in discharge instructions: n/a  Patient has discharge needs (If yes, please explain):no

## 2019-04-02 NOTE — DISCHARGE SUMMARY
[facility]  Hospitalist Discharge Summary       Date of Admission:  4/1/2019  Date of Discharge:  4/2/2019  Discharging Provider: Noreen Crump CNP      Discharge Diagnoses   Principal Problem:    Intractable nausea and vomiting  Active Problems:    Elevated BP without diagnosis of hypertension    Concussion without loss of consciousness    Fracture of sacrum (H)      Follow-ups Needed After Discharge   Follow-up Appointments     Follow-up and recommended labs and tests       Follow up with primary care provider, [facility], within   7-14 days for hospital follow- up.  No follow up labs or test are needed.               Discharge Disposition   Discharged to home  Condition at discharge: Stable    Hospital Course       Intractable nausea and vomiting  Elizabeth De Los Santos is a 58 year old female who presents with intractable nausea. She fell on 3/29/19, she was stepping down from a truck bed and missed step and hit her back of the head and buttocks area. Was seen in the ER in [place], apparently initial head and neck ct was normal. Sacral Xray show fracture. Was sent home but she keep nauseated and vomiting continuously, due to that pt come again today. Pt was given compazine in the ER and seem to be improving. Repeat head CT today in our ER show no intracranial abnormality seem to be from concussion. Repeat ct on 04/01/19 showed no bleeding.  Patient was admitted for an overnight stay with IVF hydration and antiemetics. Patient was feeling much better this morning, she did not have nausea and her headache was nearly resolved. Patient with no neurologic symptoms, no dizziness. Patient discharged home in stable condition. Patient states she has nausea medications at home and she does not need a prescription.     Elevated BP without diagnosis of hypertension  Stable, on oral propranolol at home but PRN only. Continue home dosing.       Concussion without loss of  consciousness  As above, patient with a fall 3 days ago. Initial head ct show no bleeding. Repeat head ct normal. Neuro checks normal, improved symptoms on day of discharge.       Fracture of sacrum (H)  Stable, from fall. Xray show fracture in mid sacrum on 3/29/19.  Patient states her pain was under control and she has pain medications at home.       Consultations This Hospital Stay   PHYSICAL THERAPY ADULT IP CONSULT  OCCUPATIONAL THERAPY ADULT IP CONSULT    Code Status   Full Code    Time Spent on this Encounter   I, Noreen Crump, personally saw the patient today and spent less than or equal to 30 minutes discharging this patient.       Noreen Crump, [facility]  ______________________________________________________________________    Physical Exam   Vital Signs: Temp: 97.7  F (36.5  C) Temp src: Oral BP: 156/77 Pulse: 72 Heart Rate: 69 Resp: 18 SpO2: 96 % O2 Device: None (Room air)    Weight: 154 lbs 12.21 oz    Exam:  Constitutional: healthy, alert and no distress  Cardiovascular: No edema or JVD., negative findings: regular rate and rhythm  Respiratory: negative findings: lungs clear to auscultation  Gastrointestinal: Abdomen soft, non-tender. BS normal.    Musculoskeletal: extremities normal- no gross deformities noted, gait normal and normal muscle tone  Skin: no suspicious lesions or rashes  Neurologic: Gait normal. Reflexes normal and symmetric. Sensation grossly WNL.           Primary Care Physician   [facility]    Immunizations       Discharge Orders      Reason for your hospital stay    You were in the hospital for nausea and vomiting related to a concussion.     Follow-up and recommended labs and tests     Follow up with primary care provider, [facility], within 7-14 days for hospital follow- up.  No follow up labs or test are needed.     Activity    Your activity upon discharge: activity as tolerated     Diet    Follow  this diet upon discharge: Orders Placed This Encounter      Room Service      Regular Diet Adult       Significant Results and Procedures   Most Recent 3 CBC's:  Recent Labs   Lab Test 04/02/19  0534 04/01/19  1850   WBC 7.5 9.1   HGB 11.2* 12.8   MCV 92 92    251     Most Recent 3 BMP's:  Recent Labs   Lab Test 04/02/19  0534 04/01/19  1850 12/10/13   * 144  --    POTASSIUM 3.8 3.4 3.5   CHLORIDE 113* 109  --    CO2 27 23  --    BUN 11 9  --    CR 0.67 0.57 0.68   ANIONGAP 6 12  --    SHANTA 8.4* 8.9  --    GLC 84 101*  --    ,   Results for orders placed or performed during the hospital encounter of 04/01/19   CT Head w/o Contrast    Narrative    CT SCAN OF THE HEAD WITHOUT CONTRAST   4/1/2019 7:52 PM     HISTORY: Headache, posttraumatic.    TECHNIQUE:  Axial images of the head and coronal reformations without  IV contrast material. Radiation dose for this scan was reduced using  automated exposure control, adjustment of the mA and/or kV according  to patient size, or iterative reconstruction technique.    COMPARISON: 3/31/2019.    FINDINGS:  The cerebral hemispheres, brainstem, and cerebellum  demonstrate normal morphology and attenuation. Incidental cavum septum  pellucidum is noted. No evidence of ischemia, hemorrhage, mass, mass  effect, or hydrocephalus. The visualized calvarium, skull base,  paranasal sinuses, and extracranial soft tissues are unremarkable.      Impression    IMPRESSION:  No acute intracranial abnormality.       KARLA JARA MD       Discharge Medications   Current Discharge Medication List      CONTINUE these medications which have NOT CHANGED    Details   acetaminophen (TYLENOL) 500 MG tablet Take 1-2 tablets (500-1,000 mg) by mouth every 6 hours as needed  Refills: 0      ibuprofen (ADVIL/MOTRIN) 200 MG tablet Take 3 tablets (600 mg) by mouth every 6 hours as needed for pain or fever (TAKE WITH FOOD) TAKE WITH FOOD AS NEEDED FOR PAIN  Refills: 0      ondansetron (ZOFRAN ODT)  4 MG ODT tab Take 1 tablet (4 mg) by mouth every 6 hours as needed for nausea  Qty: 12 tablet, Refills: 0      oxyCODONE (ROXICODONE) 5 MG tablet Take 1 tablet (5 mg) by mouth every 6 hours as needed for pain  Qty: 12 tablet, Refills: 0      prochlorperazine (COMPAZINE) 10 MG tablet Take 1 tablet (10 mg) by mouth every 6 hours as needed for nausea or vomiting  Qty: 12 tablet, Refills: 0      propranolol (INDERAL) 10 MG tablet Take 10 mg by mouth 2 times daily           Allergies   Allergies   Allergen Reactions     Flexeril [Cyclobenzaprine]      Headache, tight chest

## 2019-04-02 NOTE — PROGRESS NOTES
SPIRITUAL HEALTH SERVICES  SPIRITUAL ASSESSMENT Progress Note  [facility]      During Rounding,  introduced himself to Elizabeth De Los Santos & her  and informed them of his availability.    Leander Hinkle M.Div., [facility]  Staff   Office tel: [phone]

## 2019-04-02 NOTE — PLAN OF CARE
Discharge Planner OT   Patient plan for discharge: Home with assist from   Current status: OT eval completed and treatment initiated. Pt demonstrates independence with ADL and functional mobility. Pt scored a 8/90 for the symptom severity score on the Concussion Symptom Assessment. Pt reports most symptoms with light sensitivity and eye movement. Pt educated on managing symptoms of concussion and benefits of outpatient OT and PT services. Pt's  is retired and will be home 24/7 to assist as needed. Pt lives in a single story home with no stairs to enter or within home. Pt completed toileting, bed mobility, and functional mobility independently during session with no LOB or dizziness/increase in symptoms noted.   Barriers to return to prior living situation: None from an OT perspective.   Recommendations for discharge: Home with assist from  and OP OT and PT for concussion management  Rationale for recommendations: Pt has adequate level of support from family within pt's home environment during ADL and IADL tasks. No further OT needs identified. Patient demonstrates no inpatient physical therapy needs.       Entered by: Joceline Carbajal 04/02/2019 12:29 PM        Occupational Therapy Discharge Summary    Reason for therapy discharge:    All goals and outcomes met, no further needs identified.    Progress towards therapy goal(s). See goals on Care Plan in [facility] electronic health record for goal details.  Goals met    Therapy recommendation(s):    Continued therapy is recommended.  Rationale/Recommendations:  see above.     QUINCY Jackson/L  [facility]  [phone]

## 2019-04-02 NOTE — PROGRESS NOTES
Pt given toast which she was able to keep down, denies nausea.  Given oxycodone for headache, with improvement.  Pt has a fx tailbone, side lying with pillows underneath her for comfort.  IVF @ 100 hr.

## 2019-04-02 NOTE — PLAN OF CARE
Patient states that she has not had any nausea during the night. Pain is manageable and she has declined any medication offered several times. No complaints of any numbness or dizziness. Neuros WNL. Vitals stable. Bruising noted around both eyes.

## 2019-04-02 NOTE — H&P
[facility]    History and Physical  Hospitalist       Date of Admission:  4/1/2019    Assessment & Plan   Principal Problem:    Intractable nausea and vomiting    Assessment: seem to be from concussion. Repeat ct on 04/01/19 show no bleeding. Pt fall 3 days ago    Plan: will put on antiemetic protocol. Will give fluid iv.     Active Problems:    Elevated BP without diagnosis of hypertension    Assessment: stable, on oral propranolol at home but PRN only    Plan: will not start any antihypertensive medication for now. Monitor only      Concussion without loss of consciousness    Assessment: fall 3 days ago. Initial head ct show no bleeding. Repeat head ct normal    Plan: do neurocheck q4h for the next 24 hours. Will do PT and OT      Fracture of sacrum (H)    Assessment: xray show fracture in mid sacrum on 3/29/19    Plan: will do PT and OT for pain control. Put on tylenol, oxycondone and iv morphine      Elizabeth De Los Santos is a 58 year old female who presents with intractable nausea. She fell on 3/29/19, she was stepping down from a truck bed and missed step and hit her back of the head and buttocks area. Was seen in the ER in [place], [facility] initial head and neck ct was normal. Sacral Xray show fracture. Was sent home but she keep nauseated and vomiting continuously, due to that pt come again today. Pt was given compazine in the ER and seem to be improving. Repeat head CT today in our ER show no intracranial abnormality       # Pain Assessment:  Current Pain Score 4/1/2019   Pain score (0-10) 5   - Elizabeth is experiencing pain due to sacral fracture and headache. Pain management was discussed and the plan was created in a collaborative fashion.  Elizabeth's response to the current recommendations: engaged  - Opioid regimen: oxycodone and iv morpine  - Response to opioid medications: Reduction of symptoms   - Bowel regimen: miralax  - Pharmacologic adjuvants: Acetaminophen        DVT  Prophylaxis: Pneumatic Compression Devices  Code Status: Full Code    Disposition: Expected discharge in 1-2 days once symptom improved .    Alida Farnsworth    Primary Care Physician   [facility]    Chief Complaint   Nausea, headache     History is obtained from the patient    History of Present Illness   Elizabeth De Los Santos is a 58 year old female who presents with intractable nausea. She fell on 3/29/19, she was stepping down from a truck bed and missed step and hit her back of the head and buttocks area. Was seen in the ER in [place], [facility] initial head and neck ct was normal. Sacral Xray show fracture. Was sent home but she keep nauseated and vomiting continuously, due to that pt come again today. Pt was given compazine in the ER and seem to be improving. Repeat head CT today in our ER show no intracranial abnormality       Past Medical History    I have reviewed this patient's medical history and updated it with pertinent information if needed.   Past Medical History:   Diagnosis Date     Osteoporosis        Past Surgical History   I have reviewed this patient's surgical history and updated it with pertinent information if needed.  Past Surgical History:   Procedure Laterality Date     NO HISTORY OF SURGERY         Prior to Admission Medications   Prior to Admission Medications   Prescriptions Last Dose Informant Patient Reported? Taking?   acetaminophen (TYLENOL) 500 MG tablet 4/1/2019 at 1600  No Yes   Sig: Take 1-2 tablets (500-1,000 mg) by mouth every 6 hours as needed   ibuprofen (ADVIL/MOTRIN) 200 MG tablet 3/31/2019 at Unknown time  No Yes   Sig: Take 3 tablets (600 mg) by mouth every 6 hours as needed for pain or fever (TAKE WITH FOOD) TAKE WITH FOOD AS NEEDED FOR PAIN   ondansetron (ZOFRAN ODT) 4 MG ODT tab 4/1/2019 at 1130  No Yes   Sig: Take 1 tablet (4 mg) by mouth every 6 hours as needed for nausea   oxyCODONE (ROXICODONE) 5 MG tablet 4/1/2019 at 1030  No Yes   Sig: Take 1  tablet (5 mg) by mouth every 6 hours as needed for pain   prochlorperazine (COMPAZINE) 10 MG tablet 4/1/2019 at 1515  No Yes   Sig: Take 1 tablet (10 mg) by mouth every 6 hours as needed for nausea or vomiting   propranolol (INDERAL) 10 MG tablet Past Week at Unknown time  Yes Yes   Sig: Take 10 mg by mouth 2 times daily      Facility-Administered Medications: None     Allergies   Allergies   Allergen Reactions     Flexeril [Cyclobenzaprine]      Headache, tight chest       Social History   I have reviewed this patient's social history and updated it with pertinent information if needed. Elizabeth De Los Santos  reports that  has never smoked. she has never used smokeless tobacco. She reports that she does not drink alcohol or use drugs.    Family History   I have reviewed this patient's family history and updated it with pertinent information if needed.   Family History   Problem Relation Age of Onset     Alzheimer Disease Mother      Cancer Father 38        1968, leukemia     Blood Disease Brother         lymphoma     Blood Disease Daughter         factor 5       Review of Systems   The 10 point Review of Systems is negative other than noted in the HPI or here.     Physical Exam   Temp: 98  F (36.7  C) Temp src: Temporal BP: 154/88   Heart Rate: 82 Resp: 20 SpO2: 97 % O2 Device: None (Room air)    Vital Signs with Ranges  Temp:  [98  F (36.7  C)] 98  F (36.7  C)  Heart Rate:  [82] 82  Resp:  [20] 20  BP: (154)/(88) 154/88  SpO2:  [97 %] 97 %  157 lbs 11.2 oz    Constitutional: alert, oriented, in moderate distress  Eyes: PERRLA, periorbital ecchymosis noted on the right eye   HEENT: normocephalic, periorbital ecchymosis noted on the right eye. ENT normal  Respiratory: clear to auscultation bilaterally  Cardiovascular: regular rate and rhythm  GI: supple, non tender non distended positive bowel sound  Lymph/Hematologic: no lymph node enlargement  Genitourinary: not examined  Skin: no rash or bruise aside from the right  orbital area  Musculoskeletal: ROM is intact on the upper and lower extremity  Neurologic: alert oriented, normal speech. No focal abnormality  Psychiatric: affect normal     Data     Data reviewed today:    Recent Results (from the past 168 hour(s))   CBC with platelets differential    Collection Time: 04/01/19  6:50 PM   Result Value Ref Range    WBC 9.1 4.0 - 11.0 10e9/L    RBC Count 4.23 3.8 - 5.2 10e12/L    Hemoglobin 12.8 11.7 - 15.7 g/dL    Hematocrit 38.7 35.0 - 47.0 %    MCV 92 78 - 100 fl    MCH 30.3 26.5 - 33.0 pg    MCHC 33.1 31.5 - 36.5 g/dL    RDW 12.3 10.0 - 15.0 %    Platelet Count 251 150 - 450 10e9/L    Diff Method Automated Method     % Neutrophils 67.7 %    % Lymphocytes 21.0 %    % Monocytes 7.4 %    % Eosinophils 2.9 %    % Basophils 0.7 %    % Immature Granulocytes 0.3 %    Nucleated RBCs 0 0 /100    Absolute Neutrophil 6.2 1.6 - 8.3 10e9/L    Absolute Lymphocytes 1.9 0.8 - 5.3 10e9/L    Absolute Monocytes 0.7 0.0 - 1.3 10e9/L    Absolute Basophils 0.1 0.0 - 0.2 10e9/L    Abs Immature Granulocytes 0.0 0 - 0.4 10e9/L    Absolute Nucleated RBC 0.0    INR    Collection Time: 04/01/19  6:50 PM   Result Value Ref Range    INR 1.02 0.86 - 1.14   Partial thromboplastin time    Collection Time: 04/01/19  6:50 PM   Result Value Ref Range    PTT 29 22 - 37 sec   Basic metabolic panel    Collection Time: 04/01/19  6:50 PM   Result Value Ref Range    Sodium 144 133 - 144 mmol/L    Potassium 3.4 3.4 - 5.3 mmol/L    Chloride 109 94 - 109 mmol/L    Carbon Dioxide 23 20 - 32 mmol/L    Anion Gap 12 3 - 14 mmol/L    Glucose 101 (H) 70 - 99 mg/dL    Urea Nitrogen 9 7 - 30 mg/dL    Creatinine 0.57 0.52 - 1.04 mg/dL    GFR Estimate >90 >60 mL/min/[1.73_m2]    GFR Estimate If Black >90 >60 mL/min/[1.73_m2]    Calcium 8.9 8.5 - 10.1 mg/dL      Recent Results (from the past 24 hour(s))   CT Head w/o Contrast    Narrative    CT SCAN OF THE HEAD WITHOUT CONTRAST   4/1/2019 7:52 PM     HISTORY: Headache,  posttraumatic.    TECHNIQUE:  Axial images of the head and coronal reformations without  IV contrast material. Radiation dose for this scan was reduced using  automated exposure control, adjustment of the mA and/or kV according  to patient size, or iterative reconstruction technique.    COMPARISON: 3/31/2019.    FINDINGS:  The cerebral hemispheres, brainstem, and cerebellum  demonstrate normal morphology and attenuation. Incidental cavum septum  pellucidum is noted. No evidence of ischemia, hemorrhage, mass, mass  effect, or hydrocephalus. The visualized calvarium, skull base,  paranasal sinuses, and extracranial soft tissues are unremarkable.      Impression    IMPRESSION:  No acute intracranial abnormality.       KARLA JARA MD

## 2019-04-02 NOTE — PROGRESS NOTES
04/02/19 0955   Quick Adds   Type of Visit Initial Occupational Therapy Evaluation   Living Environment   Lives With spouse   Living Arrangements house  (town home)   Home Accessibility no concerns   Living Environment Comment Pt lives with her  in a single story home. Pt has a walk in shower and tub shower   Self-Care   Usual Activity Tolerance good   Current Activity Tolerance moderate   Regular Exercise Yes   Activity/Exercise Type walking;running/jogging   Exercise Amount/Frequency daily   Functional Level   Ambulation 0-->independent   Transferring 0-->independent   Toileting 0-->independent   Bathing 0-->independent   Dressing 0-->independent   Eating 0-->independent   Communication 0-->understands/communicates without difficulty   Swallowing 0-->swallows foods/liquids without difficulty   Cognition 0 - no cognition issues reported   Fall history within last six months yes   Number of times patient has fallen within last six months 1   Which of the above functional risks had a recent onset or change? none   General Information   Onset of Illness/Injury or Date of Surgery - Date 04/01/19   Referring Physician Alida Farnsworth MD   Patient/Family Goals Statement Return home   Additional Occupational Profile Info/Pertinent History of Current Problem Pt is a 58 year old female being seen for OT evaluation s/p concussion. Pt hit her head at work on 3/29/19 and sustained a concussion without loss of consciousness. Over the weekend pt had increased nausea and vomiting and presented to ED on 4/1/19    Cognitive Status Examination   Orientation orientation to person, place and time   Level of Consciousness alert   Follows Commands (Cognition) WNL   Memory intact   Attention No deficits were identified   Organization/Problem Solving No deficits were identified   Executive Function No deficits were identified   Bathing   Level of [place] independent   Upper Body Dressing   Level of [place]: Dress  "Upper Body independent   Lower Body Dressing   Level of [place]: Dress Lower Body independent   Toileting   Level of [place]: Toilet independent   Grooming   Level of [place]: Grooming independent   Eating/Self Feeding   Level of [place]: Eating independent   Instrumental Activities of Daily Living (IADL)   Previous Responsibilities meal prep;housekeeping;laundry;shopping;yardwork;medication management;finances;driving;work   Activities of Daily Living Analysis   Impairments Contributing to Impaired Activities of Daily Living sensory feedback impaired;pain;fear and anxiety   General Therapy Interventions   Planned Therapy Interventions IADL retraining;cognition;ADL retraining   Clinical Impression   Criteria for Skilled Therapeutic Interventions Met yes, treatment indicated   OT Diagnosis Concussion related symptoms impacting performance and safety with ADL, IADL, work, and leisure tasks   Influenced by the following impairments s/p concussion   Assessment of Occupational Performance 1-3 Performance Deficits   Identified Performance Deficits home mgmt, work   Clinical Decision Making (Complexity) Low complexity   Therapy Frequency other (see comments)  (one time treat)   Predicted Duration of Therapy Intervention (days/wks) one time treat   Anticipated Discharge Disposition Home with Assist;Home with Outpatient Therapy   Risks and Benefits of Treatment have been explained. Yes   Patient, Family & other staff in agreement with plan of care Yes   Clinical Impression Comments Pt would benefit from skilled inpatient OT services to address concussion related symptoms to improve performance on daily tasks and decrease negative effects of concussion symptoms   [facility] [employer] TM \"6 Clicks\"   2016, Trustees of [facility], under license to [employer].  All rights reserved.   6 Clicks Short Forms Daily Activity Inpatient Short Form   [facility] AM-PAC  \"6 Clicks\" Daily Activity " Inpatient Short Form   1. Putting on and taking off regular lower body clothing? 4 - None   2. Bathing (including washing, rinsing, drying)? 4 - None   3. Toileting, which includes using toilet, bedpan or urinal? 4 - None   4. Putting on and taking off regular upper body clothing? 4 - None   5. Taking care of personal grooming such as brushing teeth? 4 - None   6. Eating meals? 4 - None   Daily Activity Raw Score (Score out of 24.Lower scores equate to lower levels of function) 24   Total Evaluation Time   Total Evaluation Time (Minutes) 12     Joceline Carbajal OTR/L  [facility]  [phone]

## 2019-04-02 NOTE — PLAN OF CARE
S-(situation): Physical Therapy orders acknowledged    B-(background): Patient is a 58 year old female, status post fall 3/29/19 presenting with post concussive symptoms with medial history of HTN and osteoporosis.     A-(assessment): Patient seen by occupation therapy this morning who reports patient mobilizes independently and without dizziness. No inpatient physical therapy needs identified.    R-(recommendations): Discharge inpatient physical therapy orders. Follow up with outpatient concussion rehabilitation in accordance with occupation therapy recommendations.     Thank you for your referral.    Lizet Logan, PT, DPT, ATC    [facility]    O: [phone]  E: [email or web address]

## 2019-04-02 NOTE — ED NOTES
ED Nursing criteria listed below was addressed during verbal handoff:     Abnormal vitals: No  Abnormal results: No  Med Reconciliation completed: Yes  Meds given in ED: Yes  Any Overdue Meds: No  Core Measures: Yes  Isolation: No  Special needs: No  Skin assessment: Yes    Observation Patient  Education provided: Yes

## 2019-04-03 ENCOUNTER — TELEPHONE (OUTPATIENT)
Dept: FAMILY MEDICINE | Facility: OTHER | Age: 59
End: 2019-04-03

## 2019-04-03 NOTE — TELEPHONE ENCOUNTER
The last time she was seen by a provider here, it was 2014. Looks like she is seen at [facility]. She would need to contact them or set up a hospital follow up with Lauryn.    Patient informed.     Next 5 appointments (look out 90 days)    Apr 09, 2019  9:00 AM CDT  [facility] [address] [place] [phone]  [phone]        Yolanda Crump [facility]

## 2019-04-03 NOTE — TELEPHONE ENCOUNTER
Reason for Call:  Other work ability report    Detailed comments: patient was discharged yesterday from hospital and work comp wants a work ability report written up. Would  [place]. She was told she could go back as she feels, but maybe the first couple of days, half days.   Phone Number Patient can be reached at: Home number on file [phone] (home)    Best Time: any    Can we leave a detailed message on this number? YES    Call taken on 4/3/2019 at 4:31 PM by Dilcia Adams

## 2020-05-01 ENCOUNTER — OFFICE VISIT (OUTPATIENT)
Dept: FAMILY MEDICINE | Facility: CLINIC | Age: 60
End: 2020-05-01
Payer: COMMERCIAL

## 2020-05-01 ENCOUNTER — HOSPITAL ENCOUNTER (OUTPATIENT)
Dept: GENERAL RADIOLOGY | Facility: CLINIC | Age: 60
End: 2020-05-01
Attending: PHYSICIAN ASSISTANT
Payer: COMMERCIAL

## 2020-05-01 VITALS
OXYGEN SATURATION: 99 % | RESPIRATION RATE: 18 BRPM | BODY MASS INDEX: 27.79 KG/M2 | SYSTOLIC BLOOD PRESSURE: 138 MMHG | TEMPERATURE: 96.5 F | HEART RATE: 77 BPM | WEIGHT: 167 LBS | DIASTOLIC BLOOD PRESSURE: 92 MMHG
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
## 2020-05-01 PROCEDURE — 99213 OFFICE O/P EST LOW 20 MIN: CPT | Performed by: PHYSICIAN ASSISTANT

## 2020-05-01 PROCEDURE — 73030 X-RAY EXAM OF SHOULDER: CPT | Mod: TC

## 2020-05-01 RX ORDER — PREDNISONE 20 MG/1
20 TABLET ORAL 2 TIMES DAILY
Qty: 10 TABLET | Refills: 0 | Status: SHIPPED | OUTPATIENT
Start: 2020-05-01 | End: 2020-05-06

## 2020-05-01 RX ORDER — METHOCARBAMOL 500 MG/1
500 TABLET, FILM COATED ORAL
Qty: 14 TABLET | Refills: 0 | Status: SHIPPED | OUTPATIENT
Start: 2020-05-01

## 2020-05-01 ASSESSMENT — PAIN SCALES - GENERAL: PAINLEVEL: MODERATE PAIN (5)

## 2020-05-01 NOTE — PROGRESS NOTES
Subjective     Elizabeth De Los Santos is a 59 year old female who presents to clinic today for the following health issues:    HPI   Joint Pain    Onset: 1 week    Description:   Location: right shoulder  Character: Sharp and Burning    Intensity: moderate    Progression of Symptoms: worse    Accompanying Signs & Symptoms:  Other symptoms: none    History:   Previous similar pain: no       Precipitating factors:   Trauma or overuse: YES    Alleviating factors:  Improved by: NSAID     Therapies Tried and outcome:     Patient is a 59 year old female who presents today for evaluation of 1 week of right shoulder pain. She said that she injured her shoulder 1 week ago while running on her treadmill. She cannot recall the specific details of how her accident, but says that she believes her legs became tangled. She fell onto her right shoulder and says that since the injury she has experienced 4/10 pain with general ROM and while laying on the shoulder. She denies numbness, tingling, bruising, weakness or swelling.       Reviewed and updated as needed this visit by Provider         Review of Systems   ROS COMP: Constitutional, HEENT, cardiovascular, pulmonary, gi and gu systems are negative, except as otherwise noted.      Objective    BP (!) 138/92   Pulse 77   Temp 96.5  F (35.8  C) (Temporal)   Resp 18   Wt 75.8 kg (167 lb)   SpO2 99%   BMI 27.79 kg/m    Body mass index is 27.79 kg/m .  Physical Exam   GENERAL: healthy, alert and no distress  RESP: lungs clear to auscultation - no rales, rhonchi or wheezes  CV: regular rate and rhythm, normal S1 S2, no S3 or S4, no murmur, click or rub, no peripheral edema and peripheral pulses strong  MS: no gross musculoskeletal defects noted, no edema, normal AROM, some discomfort with forward flexion and overhead reaching, neg crossover, dixon, empty can test. Mild tenderness to medial border of the right scapula.   NEURO: Normal strength and tone, mentation intact and speech  normal  PSYCH: mentation appears normal, affect normal/bright    Diagnostic Test Results:  Xray - EXAM: XR SHOULDER RT G/E 3 VW  DATE/TIME: 5/1/2020 2:54 PM      INDICATION: Lateral shoulder pain after a subacute injury.   COMPARISON: None.                                                                      IMPRESSION: Normal joint spacing and alignment. No fracture. Convex  left upper thoracic curve.     ISAIAS MOJICA MD        Assessment & Plan     1. Fall, initial encounter  Discussed initial impression of imaging and advised use of ice to reduce swelling as well as prednisone burst for next week. Reviewed possible adverse effects of the medication. Patient will also have muscle relaxant to use as needed. Cautioned about sedative effects and instructed her not use when driving or operating machinery.   - predniSONE (DELTASONE) 20 MG tablet; Take 1 tablet (20 mg) by mouth 2 times daily for 5 days  Dispense: 10 tablet; Refill: 0  - methocarbamol (ROBAXIN) 500 MG tablet; Take 1 tablet (500 mg) by mouth nightly as needed for muscle spasms  Dispense: 14 tablet; Refill: 0    Return in about 4 months (around 9/1/2020) for Return for scheduled annual checkup with PCP.    Isaias Gordon PA-C  [facility]

## 2020-05-01 NOTE — PATIENT INSTRUCTIONS
Patient Education     Bruises (Contusions)    A contusion is a bruise. A bruise happens when a blow to your body doesn't break the skin but does break blood vessels beneath the skin. Blood leaking from the broken vessels causes redness and swelling. As it heals, your bruise is likely to turn colors like purple, green, and yellow. This is normal. The bruise should fade in 2 or 3 weeks.  Factors that make you more likely to bruise  Almost everyone bruises now and then. Certain people do bruise more easily than others. You're more prone to bruising as you get older. That's because blood vessels become more fragile with age. You're also more likely to bruise if you have a clotting disorder such as hemophilia or take medicines that reduce clotting, including aspirin and coumadin. You are also more likley to bruise if you have liver disease and or drink alcohol daily.  When to go to the emergency room (ER)  Bruises almost always heal on their own without special treatment. But for some people, a bad bruise can be serious. Seek medical care if you:    Have a clotting disorder such as hemophilia    Have cirrhosis or other serious liver disease    Take blood-thinning medicines such as warfarin  What to expect in the ER  A doctor will examine your bruise and ask about any health conditions you have. In some cases, you may have a test to check how well your blood clots. Other treatment will depend on your needs.  Follow-up care  Sometimes a bruise gets worse instead of better. It may become larger and more swollen. This can occur when your body walls off a small pool of blood under the skin (hematoma). In very rare cases, your doctor may need to drain extra blood from the area.  Tip:  Apply an ice pack or bag of frozen peas to a bruise. Keep a thin cloth between the ice or frozen peas and your skin. The cold can help reduce redness and swelling.  Date Last Reviewed: 12/1/2016 2000-2019 [employer] [address]  [place] [address]. All rights reserved. This information is not intended as a substitute for professional medical care. Always follow your healthcare professional's instructions.           Patient Education     Understanding Rotator Cuff Tendonitis    Tendons are tough tissues that connect muscles to bone. A group of 4 muscles and their tendons form a  cuff  around the head of the upper arm bone. This is called the rotator cuff. It connects the upper arm to the shoulder blade. It gives the shoulder joint stability and strength.  If tendons are injured or strained, they may become irritated and swollen (inflamed). This is called tendonitis. Rotator cuff tendonitis may cause shoulder pain and loss of function.  What causes rotator cuff tendonitis?  Tendonitis results when the rotator cuff tendons are injured or overworked. The most common cause of injury is repetitive overhead activities. These can be work-related activities such as reaching, pushing, or lifting. Or they can be sports-related activities such as throwing, swimming, or lifting weights.  Symptoms of rotator cuff tendonitis  Pain on the side of the upper arm is the most common symptom. Pain may get worse with overhead movements or when you raise the arm above shoulder level. It may also hurt to lie on the shoulder at night.  Treatment for rotator cuff tendonitis  Treatment may include the following:    Active rest. This lets the rotator cuff heal. Active rest means using your arm and shoulder, but avoiding activities that cause pain, such as reaching overhead or sleeping on the shoulder.    Cold packs. Putting ice packs on the shoulder helps reduce swelling and relieve pain.    Pain medicines. Prescription or over-the-counter pain medicines can help relieve pain and swelling.    Arm and shoulder exercises. These help keep the shoulder joint mobile as it heals. They also help improve the strength of muscles around the joint.  Possible  complications  It might be tempting to stop using your shoulder completely to avoid pain. But doing so may lead to a condition called  frozen shoulder.  To help prevent this, following instructions you are given for active rest and for doing exercises to help your shoulder heal.  When to call your healthcare provider  Call your healthcare provider right away if you have any of these:    Fever of 100.4 F (38 C) or higher, or as directed    Symptoms that don t get better, or get worse    New symptoms   Date Last Reviewed: 3/10/2016    4265-5433 [employer]. [address] [place] [address], [place] [address]. All rights reserved. This information is not intended as a substitute for professional medical care. Always follow your healthcare professional's instructions.

## 2020-05-01 NOTE — NURSING NOTE
Chief Complaint   Patient presents with     Shoulder Pain     Right. Fell off her threadmill and hurt her right shoulder. This happened about a week ago.